# Patient Record
Sex: FEMALE | HISPANIC OR LATINO | Employment: PART TIME | URBAN - METROPOLITAN AREA
[De-identification: names, ages, dates, MRNs, and addresses within clinical notes are randomized per-mention and may not be internally consistent; named-entity substitution may affect disease eponyms.]

---

## 2019-03-25 ENCOUNTER — HOSPITAL ENCOUNTER (EMERGENCY)
Facility: HOSPITAL | Age: 19
Discharge: HOME | End: 2019-03-25
Attending: EMERGENCY MEDICINE | Admitting: EMERGENCY MEDICINE
Payer: COMMERCIAL

## 2019-03-25 VITALS
TEMPERATURE: 98.5 F | OXYGEN SATURATION: 99 % | DIASTOLIC BLOOD PRESSURE: 77 MMHG | HEART RATE: 72 BPM | RESPIRATION RATE: 18 BRPM | SYSTOLIC BLOOD PRESSURE: 102 MMHG | WEIGHT: 177.03 LBS

## 2019-03-25 DIAGNOSIS — N93.8 DUB (DYSFUNCTIONAL UTERINE BLEEDING): Primary | ICD-10-CM

## 2019-03-25 LAB
BACTERIA UR QL AUTO: ABNORMAL /HPF
BILIRUB UR QL STRIP: NEGATIVE
CLARITY UR: CLEAR
COLOR UR: YELLOW
EXT PREG TEST URINE: NEGATIVE
GLUCOSE UR STRIP-MCNC: NEGATIVE MG/DL
HGB UR QL STRIP.AUTO: ABNORMAL
KETONES UR STRIP-MCNC: NEGATIVE MG/DL
LEUKOCYTE ESTERASE UR QL STRIP: NEGATIVE
NITRITE UR QL STRIP: NEGATIVE
NON-SQ EPI CELLS URNS QL MICRO: ABNORMAL /HPF
PH UR STRIP.AUTO: 7 [PH] (ref 4.5–8)
PROT UR STRIP-MCNC: NEGATIVE MG/DL
RBC #/AREA URNS AUTO: ABNORMAL /HPF
SP GR UR STRIP.AUTO: 1.02 (ref 1–1.03)
UROBILINOGEN UR QL STRIP.AUTO: 0.2 E.U./DL
WBC #/AREA URNS AUTO: ABNORMAL /HPF

## 2019-03-25 PROCEDURE — 81025 URINE PREGNANCY TEST: CPT | Performed by: EMERGENCY MEDICINE

## 2019-03-25 PROCEDURE — 87491 CHLMYD TRACH DNA AMP PROBE: CPT | Performed by: EMERGENCY MEDICINE

## 2019-03-25 PROCEDURE — 81001 URINALYSIS AUTO W/SCOPE: CPT

## 2019-03-25 PROCEDURE — 87591 N.GONORRHOEAE DNA AMP PROB: CPT | Performed by: EMERGENCY MEDICINE

## 2019-03-25 PROCEDURE — 99284 EMERGENCY DEPT VISIT MOD MDM: CPT

## 2019-03-25 RX ORDER — NORGESTIMATE AND ETHINYL ESTRADIOL 7DAYSX3 LO
1 KIT ORAL DAILY
COMMUNITY

## 2019-03-25 NOTE — ED PROVIDER NOTES
History  Chief Complaint   Patient presents with    Vaginal Bleeding     reports increased vaginal bleeding w/lower back pain and lower abdominal pain  started today  Patient is a 19year-old female with no significant PMH who presents with 1 day vaginal bleeding and pelvic cramping  Symptoms started gradually  She reports moderate bleeding that is heavier than her usual period  Patient has been taking OCPs as prescribed and denies similar symptoms in the past  She had nromal period 2 weeks ago and is concerned about this bleeding being eralier than expected  No associated vaginal discharge  Patient is sexually active  Prior to Admission Medications   Prescriptions Last Dose Informant Patient Reported? Taking?   norgestimate-ethinyl estradiol (ORTHO TRI-CYCLEN LO) 0 18/0 215/0 25 MG-25 MCG per tablet   Yes Yes   Sig: Take 1 tablet by mouth daily      Facility-Administered Medications: None       History reviewed  No pertinent past medical history  History reviewed  No pertinent surgical history  History reviewed  No pertinent family history  I have reviewed and agree with the history as documented  Social History     Tobacco Use    Smoking status: Never Smoker    Smokeless tobacco: Never Used   Substance Use Topics    Alcohol use: Never     Frequency: Never    Drug use: Never        Review of Systems   Constitutional: Negative for chills, fatigue and fever  HENT: Negative for congestion and sore throat  Eyes: Negative for visual disturbance  Respiratory: Negative for cough and shortness of breath  Cardiovascular: Negative for chest pain  Gastrointestinal: Negative for abdominal pain, diarrhea, nausea and vomiting  Endocrine: Negative for polyuria  Genitourinary: Positive for menstrual problem, pelvic pain and vaginal bleeding  Negative for difficulty urinating, dysuria and vaginal discharge  Musculoskeletal: Negative for arthralgias  Skin: Negative for rash  Neurological: Negative for dizziness, light-headedness and headaches  All other systems reviewed and are negative  Physical Exam  ED Triage Vitals   Temperature Pulse Respirations Blood Pressure SpO2   03/25/19 1541 03/25/19 1541 03/25/19 1541 03/25/19 1541 03/25/19 1541   98 5 °F (36 9 °C) 77 18 115/76 98 %      Temp src Heart Rate Source Patient Position - Orthostatic VS BP Location FiO2 (%)   -- 03/25/19 1644 03/25/19 1644 03/25/19 1644 --    Monitor Lying Left arm       Pain Score       03/25/19 1541       6             Orthostatic Vital Signs  Vitals:    03/25/19 1541 03/25/19 1644   BP: 115/76 102/77   Pulse: 77 72   Patient Position - Orthostatic VS:  Lying       Physical Exam   Constitutional: She is oriented to person, place, and time  She appears well-developed and well-nourished  No distress  HENT:   Head: Normocephalic and atraumatic  Eyes: Pupils are equal, round, and reactive to light  EOM are normal    Neck: Normal range of motion  Neck supple  Cardiovascular: Normal rate, regular rhythm and normal heart sounds  Pulmonary/Chest: Effort normal and breath sounds normal  No respiratory distress  Abdominal: Soft  Bowel sounds are normal  There is no tenderness  Genitourinary:   Genitourinary Comments: Trace blood from normal appearing os   Musculoskeletal: Normal range of motion  Neurological: She is alert and oriented to person, place, and time  Skin: Skin is warm and dry  Psychiatric: She has a normal mood and affect  Nursing note and vitals reviewed        ED Medications  Medications - No data to display    Diagnostic Studies  Results Reviewed     Procedure Component Value Units Date/Time    Chlamydia/GC amplified DNA by PCR [[id number]]  (Normal) Collected:  03/25/19 1728    Lab Status:  Final result Specimen:  Genital from Vaginal Updated:  03/26/19 1516     N gonorrhoeae, DNA Probe Negative     Chlamydia trachomatis, DNA Probe Negative    Narrative:       Test performed using PCR amplification of target DNA  This test is intended as an aid in the diagnosis of Chlamydial and gonococcal disease  This test has not been evaluated in patients younger than 15years of age and is not recommended for evaluation of suspected sexual abuse  Additional testing is recommended when the results do not correlate with clinical signs and symptoms  Urine Microscopic [[id number]]  (Abnormal) Collected:  03/25/19 1648    Lab Status:  Final result Specimen:  Urine, Clean Catch Updated:  03/25/19 1718     RBC, UA 4-10 /hpf      WBC, UA 0-1 /hpf      Epithelial Cells Occasional /hpf      Bacteria, UA Occasional /hpf     POCT urinalysis dipstick [[id number]]  (Abnormal) Resulted:  03/25/19 1648    Lab Status:  Final result Specimen:  Urine Updated:  03/25/19 1648    POCT pregnancy, urine [[id number]]  (Normal) Resulted:  03/25/19 1648    Lab Status:  Final result Updated:  03/25/19 1648     EXT PREG TEST UR (Ref: Negative) negative    ED Urine Macroscopic [[id number]]  (Abnormal) Collected:  03/25/19 1648    Lab Status:  Final result Specimen:  Urine Updated:  03/25/19 1646     Color, UA Yellow     Clarity, UA Clear     pH, UA 7 0     Leukocytes, UA Negative     Nitrite, UA Negative     Protein, UA Negative mg/dl      Glucose, UA Negative mg/dl      Ketones, UA Negative mg/dl      Urobilinogen, UA 0 2 E U /dl      Bilirubin, UA Negative     Blood, UA Moderate     Specific [place], UA 1 020    Narrative:       [facility] RESULT                 No orders to display         Procedures  Procedures      Phone Consults  ED Phone Contact    ED Course                               MDM  Number of Diagnoses or Management Options  DUB (dysfunctional uterine bleeding):   Diagnosis management comments: Patient is a 19year-old female with no significant PMH who presents with 1 day vaginal bleeding and pelvic cramping  Exam shows Trace blood from normal appearing os  Urine pregnancy test negative  Likely 2/2 OCPs  Reassured and discuarged with OB follow up, return precautions  Disposition  Final diagnoses:   DUB (dysfunctional uterine bleeding)     Time reflects when diagnosis was documented in both MDM as applicable and the Disposition within this note     Time User Action Codes Description Comment    3/25/2019  5:11 PM Javon Velazquez Sever Add [N93 8] DUB (dysfunctional uterine bleeding)       ED Disposition     ED Disposition Condition Date/Time Comment    Discharge Stable Mon Mar 25, 2019  5:13 PM Ximena Farah discharge to home/self care  Follow-up Information     Follow up With Specialties Details Why [address] [address] [facility] Ob[facility]etrics and Gynecology Call in 1 day Or call your gynecologist to discuss your ER visit and arrange follow up for as soon as possible  [address] [place] [address]  [phone]  [address], [phone]          Discharge Medication List as of 3/25/2019  5:14 PM      CONTINUE these medications which have NOT CHANGED    Details   norgestimate-ethinyl estradiol (ORTHO TRI-CYCLEN LO) 0 18/0 215/0 25 MG-25 MCG per tablet Take 1 tablet by mouth daily, Historical Med           No discharge procedures on file  ED Provider  Attending physically available and evaluated Ximena Farah I managed the patient along with the ED Attending      Electronically Signed by         Felicia Ely MD  03/26/19 4725

## 2019-03-25 NOTE — ED ATTENDING ATTESTATION
Alla ALICEA 22, DO, saw and evaluated the patient  I have discussed the patient with the resident/non-physician practitioner and agree with the resident's/non-physician practitioner's findings, Plan of Care, and MDM as documented in the resident's/non-physician practitioner's note, except where noted  All available labs and Radiology studies were reviewed  I was present for key portions of any procedure(s) performed by the resident/non-physician practitioner and I was immediately available to provide assistance  At this point I agree with the current assessment done in the Emergency Department  I have conducted an independent evaluation of this patient a history and physical is as follows:    19 y o  F p/w vaginal bleeding x today  Had normal period 2 weeks ago but a little bit lighter than usual   Started another period today  Having some lower abd cramps  Did not attempt to call her OBGYN  Denies CP, SOB, lightheadedness  Plan: DUB - Will check urine preg to r/o ectopic pregnancy and have pt f/u with OBGYN      Critical Care Time  Procedures

## 2019-03-26 LAB
C TRACH DNA SPEC QL NAA+PROBE: NEGATIVE
N GONORRHOEA DNA SPEC QL NAA+PROBE: NEGATIVE